# Patient Record
Sex: MALE | Race: WHITE
[De-identification: names, ages, dates, MRNs, and addresses within clinical notes are randomized per-mention and may not be internally consistent; named-entity substitution may affect disease eponyms.]

---

## 2022-01-01 ENCOUNTER — HOSPITAL ENCOUNTER (INPATIENT)
Dept: HOSPITAL 47 - EC | Age: 52
LOS: 2 days | Discharge: NURSING, REHAB OR LONG-TERM CARE | DRG: 897 | End: 2022-01-03
Attending: HOSPITALIST | Admitting: HOSPITALIST
Payer: COMMERCIAL

## 2022-01-01 DIAGNOSIS — F41.9: ICD-10-CM

## 2022-01-01 DIAGNOSIS — Z20.822: ICD-10-CM

## 2022-01-01 DIAGNOSIS — E83.42: ICD-10-CM

## 2022-01-01 DIAGNOSIS — E87.1: ICD-10-CM

## 2022-01-01 DIAGNOSIS — D53.9: ICD-10-CM

## 2022-01-01 DIAGNOSIS — E87.6: ICD-10-CM

## 2022-01-01 DIAGNOSIS — I10: ICD-10-CM

## 2022-01-01 DIAGNOSIS — F10.231: Primary | ICD-10-CM

## 2022-01-01 DIAGNOSIS — F17.210: ICD-10-CM

## 2022-01-01 DIAGNOSIS — D69.6: ICD-10-CM

## 2022-01-01 DIAGNOSIS — K70.10: ICD-10-CM

## 2022-01-01 LAB
ALBUMIN SERPL-MCNC: 4.3 G/DL (ref 3.5–5)
ALP SERPL-CCNC: 90 U/L (ref 38–126)
ALT SERPL-CCNC: 222 U/L (ref 4–49)
ANION GAP SERPL CALC-SCNC: 13 MMOL/L
AST SERPL-CCNC: 366 U/L (ref 17–59)
BASOPHILS # BLD AUTO: 0 K/UL (ref 0–0.2)
BASOPHILS NFR BLD AUTO: 1 %
BUN SERPL-SCNC: 13 MG/DL (ref 9–20)
CALCIUM SPEC-MCNC: 10 MG/DL (ref 8.4–10.2)
CHLORIDE SERPL-SCNC: 98 MMOL/L (ref 98–107)
CO2 SERPL-SCNC: 24 MMOL/L (ref 22–30)
EOSINOPHIL # BLD AUTO: 0.1 K/UL (ref 0–0.7)
EOSINOPHIL NFR BLD AUTO: 2 %
ERYTHROCYTE [DISTWIDTH] IN BLOOD BY AUTOMATED COUNT: 3.1 M/UL (ref 4.3–5.9)
ERYTHROCYTE [DISTWIDTH] IN BLOOD: 14.4 % (ref 11.5–15.5)
GLUCOSE SERPL-MCNC: 80 MG/DL (ref 74–99)
HCT VFR BLD AUTO: 35.3 % (ref 39–53)
HGB BLD-MCNC: 11.8 GM/DL (ref 13–17.5)
LIPASE SERPL-CCNC: 42 U/L (ref 23–300)
LYMPHOCYTES # SPEC AUTO: 0.7 K/UL (ref 1–4.8)
LYMPHOCYTES NFR SPEC AUTO: 15 %
MAGNESIUM SPEC-SCNC: 1.3 MG/DL (ref 1.6–2.3)
MCH RBC QN AUTO: 38.1 PG (ref 25–35)
MCHC RBC AUTO-ENTMCNC: 33.4 G/DL (ref 31–37)
MCV RBC AUTO: 114 FL (ref 80–100)
MONOCYTES # BLD AUTO: 0.3 K/UL (ref 0–1)
MONOCYTES NFR BLD AUTO: 7 %
NEUTROPHILS # BLD AUTO: 3.2 K/UL (ref 1.3–7.7)
NEUTROPHILS NFR BLD AUTO: 72 %
PLATELET # BLD AUTO: 66 K/UL (ref 150–450)
POTASSIUM SERPL-SCNC: 3.9 MMOL/L (ref 3.5–5.1)
PROT SERPL-MCNC: 7.2 G/DL (ref 6.3–8.2)
SODIUM SERPL-SCNC: 135 MMOL/L (ref 137–145)
WBC # BLD AUTO: 4.5 K/UL (ref 3.8–10.6)

## 2022-01-01 PROCEDURE — 83735 ASSAY OF MAGNESIUM: CPT

## 2022-01-01 PROCEDURE — 85025 COMPLETE CBC W/AUTO DIFF WBC: CPT

## 2022-01-01 PROCEDURE — 36415 COLL VENOUS BLD VENIPUNCTURE: CPT

## 2022-01-01 PROCEDURE — 87635 SARS-COV-2 COVID-19 AMP PRB: CPT

## 2022-01-01 PROCEDURE — 80320 DRUG SCREEN QUANTALCOHOLS: CPT

## 2022-01-01 PROCEDURE — 71045 X-RAY EXAM CHEST 1 VIEW: CPT

## 2022-01-01 PROCEDURE — 99285 EMERGENCY DEPT VISIT HI MDM: CPT

## 2022-01-01 PROCEDURE — 80053 COMPREHEN METABOLIC PANEL: CPT

## 2022-01-01 PROCEDURE — 84132 ASSAY OF SERUM POTASSIUM: CPT

## 2022-01-01 PROCEDURE — 83690 ASSAY OF LIPASE: CPT

## 2022-01-01 RX ADMIN — THERA TABS SCH EACH: TAB at 15:50

## 2022-01-01 RX ADMIN — FAMOTIDINE SCH MG: 20 TABLET, FILM COATED ORAL at 22:11

## 2022-01-01 RX ADMIN — Medication SCH MG: at 17:01

## 2022-01-01 RX ADMIN — Medication SCH MG: at 22:13

## 2022-01-01 NOTE — HP
HISTORY AND PHYSICAL



CHIEF COMPLAINT:

Hallucinations and DTs.



HISTORY OF PRESENT ILLNESS:

This 51-year-old gentleman with a past medical history of hypertension, history of

anxiety, history of nicotine dependence, ETOH, not being followed by primary physician

in the outpatient setting was apparently drinking anywhere from half to one pint.

Patient apparently went for rehab about 10 years ago. Currently the patient has been at

[place] for the last 3-4 days, but the patient was confused.  The patient was

wandering around.  The patient had hallucinations.  The patient was sent to [facility] and was admitted for further evaluation and treatment.  There is no history of

fever, rigors, chills at this time.  On admission, the patient had features of acute

delirium tremens and sodium was 135.  The patient also had evidence of alcoholic

hepatitis also.  There is no history of fever, rigors, chills. COVID-19 was negative.



PAST MEDICAL HISTORY:

History hypertension, history EtOH, history of anxiety, history of nicotine dependence.



HOME MEDICATIONS:

Reviewed include Zofran, trazodone, Tylenol, vitamin B1 and thiamine.  Other medication

noted.



ALLERGIES:

None.



FAMILY HISTORY:

No history of heart attacks or strokes in the family.



SOCIAL HISTORY:

History of heavy alcohol, THC, history of smoking.



REVIEW OF SYSTEMS:

ENT No history of diminished hearing or vision.

CARDIOVASCULAR  No angina or palpitations.

RESPIRATORY No cough, no hemoptysis.

GI No nausea, vomiting, or diarrhea.

 No dysuria or hematuria.

NERVOUS  No numbness or weakness.

ALLERGY/IMMUNOLOGY No asthma or hayfever.

MUSCULOSKELETAL As mentioned earlier.

HEMATOLOGY/ONCOLOGY Negative.

ENDOCRINE No history of diabetes or hypothyroidism.

CONSTITUTIONAL As  mentioned earlier.

DERMATOLOGY  Negative.

RHEUMATOLOGY Negative,

PSYCHIATRY As mentioned earlier.



PHYSICAL EXAMINATION:

The patient is alert and oriented x2, mildly confused.  Pulse 90, blood pressure

140/93, respirations 16, temperature 97.2, pulse ox 97% on room air.

HEENT:  Conjunctivae normal. Oral mucosa moist.

NECK:  No jugular venous distention.  No lymph node enlargement.

CARDIOVASCULAR:  S1, S2, muffled.  No S3, no S4,

RESPIRATORY: Diminished breath sounds at the bases. A few scattered rhonchi. No

crackles.

ABDOMEN: Soft, nontender.

LEGS: No edema, no swelling.

NERVOUS SYSTEM:  Diffuse tremors present.

LYMPHATICS: No lymph node in neck or axilla.

SKIN: No rash.

JOINTS: No active deforming arthropathy.



LABS:

WBC 4.2, hemoglobin 11.8 sodium 135.  Other labs are noted.



ASSESSMENT:

1. Acute delirium tremens.

2. Acute alcohol withdrawal syndrome.

3. Hyponatremia.

4. Acute alcoholic hepatitis.

5. Hypomagnesemia.

6. Anemia, macrocytic.

7. Thrombocytopenia.

8. History of hypertension.

9. History of anxiety.

10.History of nicotine dependence.

11.History of THC.



RECOMMENDATION:

In this 51-year-old gentleman who presented with multiple complex issues, we will

monitor the patient closely.  CIWA protocol.  I would recommend vitamin supplementation

and banana bag also.  Otherwise, Ativan p.r.n. I recommend continue with drug rehab.

Prognosis guarded because of multiple complex medical issues.  Further recommendations

to follow.  Also recommend close followup with primary physician and see orders for

details.  Discussed with the staff.





SIMRAN / RONNIEN: [id number] / Job#: [id number]

## 2022-01-01 NOTE — ED
General Adult HPI





- General


Chief complaint: Alcohol


Stated complaint: Hallucinations, Alcohol Withdrawal


Time Seen by Provider: 01/01/22 14:42


Source: patient, EMS, RN notes reviewed


Mode of arrival: EMS


Limitations: no limitations





- History of Present Illness


Initial comments: 





Patient is a pleasant 51-year-old male presenting to the emergency department 

with alcohol withdrawal.  Patient has been at [place] the past 3-4 days.  

Last drink prior to that.  Patient states he had some difficulty walking today. 

Patient feels shaky.  Patient had reported hallucinations.  Patient denies any 

hallucinations however later states that she thought he heard his sister talking

to him as well as thought he heard an animal.





- Related Data


                                Home Medications











 Medication  Instructions  Recorded  Confirmed


 


Acetaminophen [Tylenol] 650 mg PO Q4H PRN 01/01/22 01/01/22


 


Calcium/Magnesium/Zinc/Vitamin D 1 tab PO TID PRN 01/01/22 01/01/22





334/134/5mg   


 


Chlorpheniramine Maleate 4 mg PO Q4H PRN 01/01/22 01/01/22


 


Hyoscyamine Sulfate [Levsin] 0.125 mg PO QID PRN 01/01/22 01/01/22


 


Ibuprofen [Motrin Ib] 600 mg PO Q6H PRN 01/01/22 01/01/22


 


LORazepam [Ativan] 2 mg PO ONCE PRN 01/01/22 01/01/22


 


Loperamide HCl [Imodium A-D] 4 mg PO QID PRN 01/01/22 01/01/22


 


Multivitamins, Thera [Multivitamin 1 tab PO DAILY 01/01/22 01/01/22





(formulary)]   


 


Thiamine [Vitamin B-1] 100 mg PO DAILY 01/01/22 01/01/22


 


cloNIDine HCL [Catapres] 0.1 - 0.3 mg PO Q4H PRN 01/01/22 01/01/22


 


ondansetron HCL [Zofran] 8 mg PO Q6H PRN 01/01/22 01/01/22


 


traZODone HCL 50 - 150 mg PO HS PRN 01/01/22 01/01/22











                                    Allergies











Allergy/AdvReac Type Severity Reaction Status Date / Time


 


No Known Allergies Allergy   Verified 01/01/22 16:23














Review of Systems


ROS Statement: 


Those systems with pertinent positive or pertinent negative responses have been 

documented in the HPI.





ROS Other: All systems not noted in ROS Statement are negative.


Constitutional: Denies: fever


Eyes: Denies: eye pain


ENT: Denies: ear pain


Respiratory: Denies: cough


Cardiovascular: Denies: chest pain


Endocrine: Denies: fatigue


Gastrointestinal: Denies: abdominal pain


Genitourinary: Denies: dysuria


Musculoskeletal: Denies: back pain


Skin: Denies: rash


Neurological: Denies: weakness





Past Medical History


Past Medical History: Hypertension


History of Any Multi-Drug Resistant Organisms: None Reported


Past Surgical History: No Surgical Hx Reported


Past Psychological History: Anxiety


Smoking Status: Current every day smoker, Heavy tobacco smoker


Past Alcohol Use History: Abuse, Heavy


Past Drug Use History: Marijuana





General Exam


Limitations: no limitations


General appearance: alert, in no apparent distress


Head exam: Present: normocephalic


Eye exam: Present: normal appearance, PERRL, EOMI


ENT exam: Present: normal oropharynx


Neck exam: Present: normal inspection


Respiratory exam: Present: normal lung sounds bilaterally


Cardiovascular Exam: Present: regular rate, normal rhythm


GI/Abdominal exam: Present: soft.  Absent: tenderness


Extremities exam: Present: normal inspection


Neurological exam: Present: alert, oriented X3, CN II-XII intact, other (Patient

 does have mild resting tremor that increases with movement).  Absent: motor 

sensory deficit


Psychiatric exam: Present: normal affect, normal mood


Skin exam: Present: normal color





Course


                                   Vital Signs











  01/01/22





  14:41


 


Temperature 97.6 F


 


Pulse Rate 90


 


Respiratory 16





Rate 


 


Blood Pressure 142/93


 


O2 Sat by Pulse 97





Oximetry 














Medical Decision Making





- Medical Decision Making





Case was discussed with Dr. Bond, who will admit covering hospital call.  

Patient does have alcohol withdrawal with concern for early signs of delirious 

tremens





- Lab Data


Result diagrams: 


                                01/01/22 Unknown





                                01/01/22 Unknown





Disposition


Clinical Impression: 


 Alcohol withdrawal syndrome





Disposition: ADMITTED AS IP TO THIS HOSP


Condition: Serious


Is patient prescribed a controlled substance at d/c from ED?: No


Decision Time: 15:12

## 2022-01-02 LAB
ALBUMIN SERPL-MCNC: 4.2 G/DL (ref 3.8–4.9)
ALBUMIN/GLOB SERPL: 1.83 G/DL (ref 1.6–3.17)
ALP SERPL-CCNC: 107 U/L (ref 41–126)
ALT SERPL-CCNC: 241 U/L (ref 10–49)
ANION GAP SERPL CALC-SCNC: 18.4 MMOL/L (ref 10–18)
AST SERPL-CCNC: 317 U/L (ref 14–35)
BASOPHILS # BLD AUTO: 0.06 X 10*3/UL (ref 0–0.1)
BASOPHILS NFR BLD AUTO: 1.1 %
BUN SERPL-SCNC: 7.5 MG/DL (ref 9–27)
BUN/CREAT SERPL: 13.51 RATIO (ref 12–20)
CALCIUM SPEC-MCNC: 9 MG/DL (ref 8.7–10.3)
CHLORIDE SERPL-SCNC: 96 MMOL/L (ref 96–109)
CO2 SERPL-SCNC: 21.3 MMOL/L (ref 20–27.5)
EOSINOPHIL # BLD AUTO: 0.19 X 10*3/UL (ref 0.04–0.35)
EOSINOPHIL NFR BLD AUTO: 3.4 %
ERYTHROCYTE [DISTWIDTH] IN BLOOD BY AUTOMATED COUNT: 3.06 X 10*6/UL (ref 4.4–5.6)
ERYTHROCYTE [DISTWIDTH] IN BLOOD: 13.7 % (ref 11.5–14.5)
GLOBULIN SER CALC-MCNC: 2.3 G/DL (ref 1.6–3.3)
GLUCOSE SERPL-MCNC: 72 MG/DL (ref 70–110)
HCT VFR BLD AUTO: 33.8 % (ref 39.6–50)
HGB BLD-MCNC: 11.4 G/DL (ref 13–17)
LYMPHOCYTES # SPEC AUTO: 1.04 X 10*3/UL (ref 0.9–5)
LYMPHOCYTES NFR SPEC AUTO: 18.7 %
MACROCYTES BLD QL SMEAR: (no result)
MAGNESIUM SPEC-SCNC: 1.6 MG/DL (ref 1.5–2.4)
MCH RBC QN AUTO: 37.3 PG (ref 27–32)
MCHC RBC AUTO-ENTMCNC: 33.7 G/DL (ref 32–37)
MCV RBC AUTO: 110.5 FL (ref 80–97)
MONOCYTES # BLD AUTO: 0.5 X 10*3/UL (ref 0.2–1)
MONOCYTES NFR BLD AUTO: 9 %
NEUTROPHILS # BLD AUTO: 3.73 X 10*3/UL (ref 1.8–7.7)
NEUTROPHILS NFR BLD AUTO: 67.3 %
PLATELET # BLD AUTO: 52 X 10*3/UL (ref 140–440)
POTASSIUM SERPL-SCNC: 3.3 MMOL/L (ref 3.5–5.5)
PROT SERPL-MCNC: 6.5 G/DL (ref 6.2–8.2)
SODIUM SERPL-SCNC: 136 MMOL/L (ref 135–145)
WBC # BLD AUTO: 5.55 X 10*3/UL (ref 4.5–10)

## 2022-01-02 RX ADMIN — DIAZEPAM SCH MG: 5 INJECTION, SOLUTION INTRAMUSCULAR; INTRAVENOUS at 09:27

## 2022-01-02 RX ADMIN — THERA TABS SCH EACH: TAB at 09:27

## 2022-01-02 RX ADMIN — DIAZEPAM SCH MG: 5 INJECTION, SOLUTION INTRAMUSCULAR; INTRAVENOUS at 19:56

## 2022-01-02 RX ADMIN — DIAZEPAM SCH MG: 5 INJECTION, SOLUTION INTRAMUSCULAR; INTRAVENOUS at 02:02

## 2022-01-02 RX ADMIN — NICOTINE SCH PATCH: 21 PATCH, EXTENDED RELEASE TRANSDERMAL at 00:17

## 2022-01-02 RX ADMIN — SODIUM CHLORIDE SCH MLS/HR: 900 INJECTION, SOLUTION INTRAVENOUS at 17:04

## 2022-01-02 RX ADMIN — Medication SCH MG: at 09:27

## 2022-01-02 RX ADMIN — FAMOTIDINE SCH MG: 20 TABLET, FILM COATED ORAL at 09:27

## 2022-01-02 RX ADMIN — POTASSIUM CHLORIDE SCH MEQ: 20 TABLET, EXTENDED RELEASE ORAL at 13:21

## 2022-01-02 RX ADMIN — FAMOTIDINE SCH MG: 20 TABLET, FILM COATED ORAL at 19:56

## 2022-01-02 RX ADMIN — Medication SCH MG: at 19:56

## 2022-01-02 RX ADMIN — SODIUM CHLORIDE SCH MLS/HR: 900 INJECTION, SOLUTION INTRAVENOUS at 06:04

## 2022-01-02 RX ADMIN — Medication SCH MG: at 17:03

## 2022-01-02 RX ADMIN — NICOTINE SCH PATCH: 21 PATCH, EXTENDED RELEASE TRANSDERMAL at 09:27

## 2022-01-02 NOTE — XR
EXAMINATION TYPE: XR chest 1V portable

 

DATE OF EXAM: 1/2/2022

 

COMPARISON: None

 

INDICATION: CHF

 

TECHNIQUE: Single frontal view of the chest is obtained.

 

FINDINGS:  

The heart size is normal.  

The pulmonary vasculature is normal.  

Mild left lower lobe infiltrate is present. This is nonspecific. An infectious etiology is favored ov
er atypical pulmonary edema. Atelectasis should be considered.  

 

 

IMPRESSION:  

1. Left lower lobe infiltrate. Correlate for atelectasis or pneumonia. Follow-up can be performed.

## 2022-01-02 NOTE — PN
PROGRESS NOTE



DATE OF SERVICE:

01/02/2022



This 51-year-old gentleman with hallucination and delirium tremens is extremely weak

also today.  No chest pain.  No palpitations.  No fever.  The chest x-ray which I

reviewed was unremarkable.



PHYSICAL EXAMINATION:

Patient is sleeping.  Pulse is 78, blood pressure 140/87, respiration 18, temperature

97.9, pulse ox 98% on room air.  He is sleeping, sedated.

HEENT:  Conjunctivae normal. Oral mucosa moist.

NECK:  No jugular venous distention.  No lymph node enlargement.

CARDIOVASCULAR:  S1, S2, muffled.  No S3, no S4,

RESPIRATORY: Diminished breath sounds at the bases. A few scattered rhonchi.

ABDOMEN: Soft.

NERVOUS SYSTEM:  Could not be examined completely.



LABS:

WBC 5.3, hemoglobin 11.4, sodium 134, potassium 3.3.



ASSESSMENT:

1. Acute delirium tremens.

2. Acute alcohol withdrawal syndrome.

3. Hypokalemia.

4. Hyponatremia.

5. Acute alcoholic hepatitis.

6. Hypomagnesemia.

7. Anemia, macrocytic.

8. Thrombocytopenia.

9. History of hypertension.

10.History of anxiety.

11.History of nicotine dependence.

12.History of THC.



RECOMMENDATIONS AND DISCUSSION:

I recommend to continue current management and symptomatic treatment.  Repeat labs in

the morning.  Otherwise, continue the CIWA protocol.  Continue with supplement

vitamins.  Guarded prognosis because of multiple complex medical issues.  Further

recommendations to follow.





MMODL / IJN: [id number] / Job#: [id number]

## 2022-01-03 VITALS
RESPIRATION RATE: 14 BRPM | TEMPERATURE: 97.7 F | SYSTOLIC BLOOD PRESSURE: 135 MMHG | DIASTOLIC BLOOD PRESSURE: 78 MMHG | HEART RATE: 76 BPM

## 2022-01-03 LAB
ALBUMIN SERPL-MCNC: 4 G/DL (ref 3.8–4.9)
ALBUMIN/GLOB SERPL: 1.82 G/DL (ref 1.6–3.17)
ALP SERPL-CCNC: 115 U/L (ref 41–126)
ALT SERPL-CCNC: 218 U/L (ref 10–49)
ANION GAP SERPL CALC-SCNC: 13.7 MMOL/L (ref 10–18)
AST SERPL-CCNC: 208 U/L (ref 14–35)
BASOPHILS # BLD AUTO: 0.08 X 10*3/UL (ref 0–0.1)
BASOPHILS NFR BLD AUTO: 1.3 %
BUN SERPL-SCNC: 9.9 MG/DL (ref 9–27)
BUN/CREAT SERPL: 19.8 RATIO (ref 12–20)
CALCIUM SPEC-MCNC: 8.7 MG/DL (ref 8.7–10.3)
CHLORIDE SERPL-SCNC: 102 MMOL/L (ref 96–109)
CO2 SERPL-SCNC: 20.3 MMOL/L (ref 20–27.5)
EOSINOPHIL # BLD AUTO: 0.28 X 10*3/UL (ref 0.04–0.35)
EOSINOPHIL NFR BLD AUTO: 4.4 %
ERYTHROCYTE [DISTWIDTH] IN BLOOD BY AUTOMATED COUNT: 3.13 X 10*6/UL (ref 4.4–5.6)
ERYTHROCYTE [DISTWIDTH] IN BLOOD: 13.4 % (ref 11.5–14.5)
GLOBULIN SER CALC-MCNC: 2.2 G/DL (ref 1.6–3.3)
GLUCOSE SERPL-MCNC: 95 MG/DL (ref 70–110)
HCT VFR BLD AUTO: 35 % (ref 39.6–50)
HGB BLD-MCNC: 11.7 G/DL (ref 13–17)
LYMPHOCYTES # SPEC AUTO: 1.47 X 10*3/UL (ref 0.9–5)
LYMPHOCYTES NFR SPEC AUTO: 23.1 %
MACROCYTES BLD QL SMEAR: (no result)
MCH RBC QN AUTO: 37.4 PG (ref 27–32)
MCHC RBC AUTO-ENTMCNC: 33.4 G/DL (ref 32–37)
MCV RBC AUTO: 111.8 FL (ref 80–97)
MONOCYTES # BLD AUTO: 0.5 X 10*3/UL (ref 0.2–1)
MONOCYTES NFR BLD AUTO: 7.9 %
NEUTROPHILS # BLD AUTO: 3.98 X 10*3/UL (ref 1.8–7.7)
NEUTROPHILS NFR BLD AUTO: 62.5 %
PLATELET # BLD AUTO: 63 X 10*3/UL (ref 140–440)
POTASSIUM SERPL-SCNC: 3.6 MMOL/L (ref 3.5–5.5)
PROT SERPL-MCNC: 6.2 G/DL (ref 6.2–8.2)
SODIUM SERPL-SCNC: 136 MMOL/L (ref 135–145)
WBC # BLD AUTO: 6.36 X 10*3/UL (ref 4.5–10)

## 2022-01-03 RX ADMIN — SODIUM CHLORIDE SCH MLS/HR: 900 INJECTION, SOLUTION INTRAVENOUS at 01:36

## 2022-01-03 RX ADMIN — Medication SCH MG: at 09:06

## 2022-01-03 RX ADMIN — THERA TABS SCH EACH: TAB at 09:06

## 2022-01-03 RX ADMIN — DIAZEPAM SCH MG: 5 INJECTION, SOLUTION INTRAMUSCULAR; INTRAVENOUS at 09:06

## 2022-01-03 RX ADMIN — FAMOTIDINE SCH MG: 20 TABLET, FILM COATED ORAL at 09:06

## 2022-01-03 RX ADMIN — NICOTINE SCH PATCH: 21 PATCH, EXTENDED RELEASE TRANSDERMAL at 09:06

## 2022-01-03 NOTE — DS
DISCHARGE SUMMARY



DATE OF SERVICE:

01/03/2022



FINAL DIAGNOSES:

1. Acute delirium tremens.

2. Alcohol withdrawal syndrome.

3. Hypokalemia.

4. Hyponatremia.

5. Acute alcoholic hepatitis.

6. Hypomagnesemia.

7. Anemia, macrocytic.

8. Thrombocytopenia.

9. Hypertension.

10.Anxiety.

11.History of nicotine dependence.

12.History of THC.

13.Acute alcoholic hepatitis.



DISCHARGE DISPOSITION:

The patient will be discharged in stable condition with guarded prognosis.



HISTORY OF PRESENT ILLNESS:

This 51-year-old gentleman with past medical history of multiple medical problems

admitted with acute delirium tremens and alcohol withdrawal syndrome.  The patient

treated symptomatically.  Patient improved significantly.



On exam, vitals stable. Cardiovascular: S1, S2. Abdomen:  Sot, nontender. Nervous

system: No focal deficits.



LFTs,  and . Recommended close outpatient followup.



DISCHARGE ADVICE AND MEDICATIONS:

1. Cardiac diet.

2. Activity limited until followup.

3. Follow up with Dr. Lewis in 1-2 days.



DISCHARGE MEDICATIONS:

1. Ativan 2 mg p.r.n.

2. Catapres 0.1 p.r.n.

3. Morphine p.r.n.

4. Zofran 8 mg p.r.n.

5. Folic acid 1 mg daily.

6. Lipitor 21 daily.

7. Magnesium oxide 400 mg b.i.d.

8. Pepcid 20 mg p.o. b.i.d.

9. Thiamine 100 mg p.o. b.i.d.





MMKENNL / RONNIEN: [id number] / Job#: [id number]